# Patient Record
(demographics unavailable — no encounter records)

---

## 2024-11-25 NOTE — PLAN
[FreeTextEntry1] : here to establish care and consult for birth control:  complete hx:  did take morning after pill this month (took it about 10 days ago, advised u preg today)  in past had used nexplanon *did lose period on it then had pill but did not really like it d/w pt that I do see a lot of spotting on implant at first but then could go to no period: this is what happened last time but she was ok with it. Advised to have Dr Cyr do this for her

## 2024-11-25 NOTE — HISTORY OF PRESENT ILLNESS
[Patient reported PAP Smear was normal] : Patient reported PAP Smear was normal [Y] : Patient is sexually active [N] : Patient denies prior pregnancies [Regular Cycle Intervals] : periods have been regular [Currently Active] : currently active [Men] : men [Yes] : Yes [No] : No [Patient would like to be screened for STIs] : Patient would like to be screened for STIs [Normal Amount/Duration] :  normal amount and duration [PapSmeardate] : 6/2024 [LMPDate] : 11/3/24 [MensesFreq] : 28 [MensesLength] : 5 [TextBox_6] : 11/3/24 [FreeTextEntry1] : 11/3/24